# Patient Record
Sex: FEMALE | Race: BLACK OR AFRICAN AMERICAN | NOT HISPANIC OR LATINO | URBAN - METROPOLITAN AREA
[De-identification: names, ages, dates, MRNs, and addresses within clinical notes are randomized per-mention and may not be internally consistent; named-entity substitution may affect disease eponyms.]

---

## 2024-01-16 ENCOUNTER — TELEPHONE (OUTPATIENT)
Dept: PRIMARY CARE | Facility: CLINIC | Age: 44
End: 2024-01-16
Payer: COMMERCIAL

## 2024-01-16 NOTE — TELEPHONE ENCOUNTER
Left message letting her know that Dr. Galvan is not accepting new patients and that we can schedule her with Dr. Bowens if she likes.

## 2024-01-16 NOTE — TELEPHONE ENCOUNTER
----- Message from Loreta Stewart sent at 2024  9:11 AM EST -----  Regarding: WEB/ONLINE APPT REQUEST NO TEMPLATE DUANE  Request submitted on 1/15/2024 at 7:06:05 PM  Form Title: Request an Appointment  Submitted Data  ----------------------------------------    Appointment Information  Epic Provider Name: Nicole Galvan  Specialty: Family Medicine  Have you seen this provider in the last 3 years? No  Reason for Appointment: Overall health assessment/evaluation. I have had a primary care physician since 2019 and I need to make my health a priority again.     Preferred Time of Day: Morning  Afternoon     Preferred Day:      Preferred Location: [facility] Zip: [address]     Patient Information  First Name: Katelynn  Middle:  Last Name: Fahad  YOB: 1980  Gender: Female  Patient Address: [address] [place] [address]  Patient City: [place]  Patient State: [place]  Patient Zip: [address]     Contact Information  First Name: Katelynn  Last Name: Fahad  Phone Number: [phone]  Email: [email or web address]     Insurance Information  Insurance Carrier: [employer]  Insurance Plan: [employer] PPO  Subscriber: Myself  Patient MemberId: [id number]  Patient SSN: [id number]  Patient GroupNumber: [id number]  Subscriber First Name:  Subscriber Last Name:  subscriberID:  Relationship To Subscriber:       Guarantor Information  Guarantor First Name:  Guarantor First Name:  Guarantor :  Guarantor SSN:  Guarantor City:  Guarantor State:  Guarantor Zip:  Guarantor Home Phone:  Guarantor Work Phone:  Guarantor Legal Sex:  Relationship To Patient:  Account Type:

## 2024-01-17 NOTE — TELEPHONE ENCOUNTER
Patient says she has not seen Dr. Galvan before and does not want to schedule with one of the other DrJesse's at this time.

## 2024-03-26 ENCOUNTER — TELEPHONE (OUTPATIENT)
Dept: PRIMARY CARE | Facility: CLINIC | Age: 44
End: 2024-03-26
Payer: COMMERCIAL

## 2024-08-01 ENCOUNTER — HOSPITAL ENCOUNTER (OUTPATIENT)
Dept: RADIOLOGY | Facility: CLINIC | Age: 44
Discharge: HOME | End: 2024-08-01
Payer: COMMERCIAL

## 2024-08-01 VITALS — HEIGHT: 66 IN | BODY MASS INDEX: 36.16 KG/M2 | WEIGHT: 225 LBS

## 2024-08-01 DIAGNOSIS — Z12.31 SCREENING MAMMOGRAM FOR BREAST CANCER: ICD-10-CM

## 2024-08-01 PROCEDURE — 77067 SCR MAMMO BI INCL CAD: CPT

## 2024-08-06 ENCOUNTER — HOSPITAL ENCOUNTER (OUTPATIENT)
Dept: RADIOLOGY | Facility: EXTERNAL LOCATION | Age: 44
Discharge: HOME | End: 2024-08-06

## 2025-02-06 ENCOUNTER — ANCILLARY PROCEDURE (OUTPATIENT)
Dept: URGENT CARE | Age: 45
End: 2025-02-06
Payer: COMMERCIAL

## 2025-02-06 ENCOUNTER — OFFICE VISIT (OUTPATIENT)
Dept: URGENT CARE | Age: 45
End: 2025-02-06
Payer: COMMERCIAL

## 2025-02-06 VITALS
SYSTOLIC BLOOD PRESSURE: 138 MMHG | OXYGEN SATURATION: 98 % | WEIGHT: 245 LBS | DIASTOLIC BLOOD PRESSURE: 82 MMHG | HEART RATE: 100 BPM | BODY MASS INDEX: 39.37 KG/M2 | TEMPERATURE: 98.8 F | RESPIRATION RATE: 20 BRPM | HEIGHT: 66 IN

## 2025-02-06 DIAGNOSIS — S83.92XA SPRAIN OF LEFT KNEE, UNSPECIFIED LIGAMENT, INITIAL ENCOUNTER: Primary | ICD-10-CM

## 2025-02-06 DIAGNOSIS — S83.92XA SPRAIN OF LEFT KNEE, UNSPECIFIED LIGAMENT, INITIAL ENCOUNTER: ICD-10-CM

## 2025-02-06 PROCEDURE — 73564 X-RAY EXAM KNEE 4 OR MORE: CPT | Mod: LEFT SIDE | Performed by: PHYSICIAN ASSISTANT

## 2025-02-06 NOTE — PATIENT INSTRUCTIONS
You were seen for knee pain    X-ray: No obvious displaced fracture.  This is a preliminary reading.  I will notify you of any abnormal findings as interpreted by the radiologist    You most likely have a knee sprain    Plan  1. Take Ibuprofen as directed for pain.   2. Take Tylenol  as needed for pain.  3. Wear ace wrap or splint for comfort and support.  4. RICE therapy: Rest, ice (apply ice to area 4-5 times a day for 20 minutes at a time), compression, elevation.    5. Follow-up with orthopedics as soon as possible for continued pain longer than one week.  6. Please return to ER if you develop new or worsening symptoms.        Knee Pain/Strain:    Many general measures can help your symptoms. General measures include Modifying activities, Taking drugs that relieve pain, Applying heat or cold to the painful area, doing exercises. Bed rest, if required to relieve severe pain, should last no more than 1 or 2 days. Longer bed rest weakens the core muscles and increases stiffness, thus worsening back pain and prolonging recovery. Acetaminophen is usually recommended for pain relief unless inflammation is present. If inflammation is present, over-the-counter or prescription nonsteroidal anti-inflammatory drugs can relieve pain and reduce inflammation. Muscle relaxants, such as carisoprodol, cyclobenzaprine, diazepam, metaxalone, or methocarbamol, are sometimes given to relieve muscle spasms, but their usefulness is controversial. Application of heat or cold may help.  Cold is usually preferred to heat during the first 2 days after an injury. Ice and cold packs should not be applied directly to the skin. After the pain has subsided, light activity, as recommended by a doctor or physical therapist, can speed healing and recovery. In some cases, a course of treatment with a physical therapist can help. Specific exercises to strengthen and stretch the back and to strengthen core muscles are usually recommended to help  prevent low back pain from becoming chronic or recurring. Other preventive measures (maintaining good posture, using a medium mattress with appropriately placed pillows, lifting correctly) should be continued or started. In response to these measures, most episodes of back pain resolve in several days to 2 weeks. Regardless of treatment, 80 to 90% of such episodes resolve within 6 weeks      Knee pain is a common problem among athletes and the general population.    Etiology     There are many causes of pain in or around the knee in athletes, particularly runners, including    ° Subluxation of the patella when bending the knee  ° Chondromalacia of the undersurface of the patella (runner´s knee, which is softening of the knee cap cartilage)  ° Intra-articular pathology, such as meniscal tears and plicae (infolding of the normal synovial lining of the knee)  ° Fat pad inflammation  ° Stress fractures of the tibia  ° Malalignment of the lower extremities  ° Patellar (or infrapatellar) tendinitis (jumper´s knee, which is an overuse injury to the patellar tendon at the attachment to the lower pole of the patella)      Knee pain may be referred from the lumbar spine or hip or result from foot problems (eg, excessive pronation or rolling inward of the foot during walking or running).       Diagnosis   ° History and physical examination  ° Sometimes imaging tests      Diagnosis requires a thorough review of the injured athlete´s training program, including a history of symptom onset and aggravating factors, and a complete lower-extremity examination (for knee examination, see Approach to the Patient With Joint Disease: Physical Examination and see Knee Pain and Meniscal Injuries).     Mechanical symptoms, such as locking or catching, suggest an internal derangement of the knee such as a meniscal tear. Instability symptoms, such as giving way and loss of confidence in the extremity when twisting or turning on the knee,  suggest ligamentous injury or subluxation of the patella.     Chondromalacia is suggested by anterior knee pain after running, especially on hills, as well as pain and stiffness after sitting for any length of time (positive movie sign). On examination, pain is typically reproduced by compression of the patella against the femur.     Pain that becomes worse with weight-bearing suggests a stress fracture.    Treatment   ° Quadriceps-strengthening exercises  ° Sometimes stabilizing pads, supports, or braces  ° Nonsteroidal anti-inflammatory drugs (NSAIDs)      Treatment is tailored to the specific cause of the pain.     Treatment of chondromalacia includes quadriceps-strengthening exercises with balanced strengthening exercises for the hamstrings, use of arch supports if excessive pronation is a possible contributor, and use of NSAIDs.     For patellar subluxation, use of patella-stabilizing pads or braces may be necessary, especially in sports that require rapid, agile movements in various planes (eg, basketball, tennis).     If there is excessive pronation of the foot, and all other possible causes of knee pain have been excluded, use of an orthotic insert is sometimes useful.     Stress fractures require rest and cessation of weight-bearing activity.     Intra-articular pathology often requires surgery.

## 2025-02-06 NOTE — PROGRESS NOTES
"Subjective   Patient ID: Katelynn Vásquez is a 44 y.o. female. They present today with a chief complaint of Knee Injury (Left knee getting out of car twisted to the left and heard a pop ).    CC: Left knee pain    HPI: This is a 44-year-old female presenting for left knee pain/injury that occurred this morning while getting out of the car.  Patient reports that she accidentally twisted her knee.  Surry a pop.  Dull, achy pain localized to the general left knee with ambulating.  Denies bruising or swelling.    The injury was not associated with any skin lacerations or bleeding. No distal neurologic abnormalities such as numbness, tingling, or weakness.   No bowel or bladder dysfunction, saddle anesthesia, paresthesias, sensory deficits or weakness.  No fever.  No abdominal pain.  No chest pain or shortness of breath.      Past Medical History  Allergies as of 02/06/2025    (No Known Allergies)       (Not in a hospital admission)      Past Medical History:   Diagnosis Date    Right lower quadrant pain 01/22/2019    Abdominal wall pain in right lower quadrant       Past Surgical History:   Procedure Laterality Date    TONSILLECTOMY  04/10/2017    Tonsillectomy        reports that she has never smoked. She has never been exposed to tobacco smoke. She has never used smokeless tobacco.    Review of Systems  Review of Systems    After reviewing all body systems I have documented pertinent findings above in the history.  All other Systems reviewed and are negative for complaint.  Pertinent positive and negatives are listed in the above HPI.    Objective    Vitals:    02/06/25 1751   BP: 138/82   Pulse: 100   Resp: 20   Temp: 37.1 °C (98.8 °F)   SpO2: 98%   Weight: 111 kg (245 lb)   Height: 1.676 m (5' 6\")     No LMP recorded (lmp unknown).    Physical Exam  General: No acute distress. Well developed, well nourished.   Skin: Skin is warm, and dry. No rashes or lesions. Nailbeds pink.  Neck: Supple.   Cardiac: Regular " rate  Respiratory:  No acute respiratory distress.  Regular rate of breathing    MSK: Left knee is positive for generalized tenderness.  BL knees show no outward evidence of deformities, swelling, bruising, effusion or erythema. No tenderness with palpation to medial and lateral joint lines. No tenderness with patellar compression.  Good flexion and extension. Negative MCL/LCL laxity. Negative anterior/posterior draw test.     Good plantar and dorsiflexion.   No sensory deficits  DP pulse 2+ bilaterally.    Hip and ankle are normal. No tenderness noted. Good range of motion and strength.    Neurological: A&Ox4. Normal speech, steady gait.   Psych:  Normal affect.  Cooperative.    Procedures  Point of Care Test & Imaging Results from this visit    XR knee left 4+ views    Result Date: 2/6/2025  Interpreted By:  Brad Melo, STUDY: XR KNEE LEFT 4+ VIEWS   INDICATION: Signs/Symptoms:Lysed pain of the left knee.  Injury. twisting.   COMPARISON: None   ACCESSION NUMBER(S): [id number]   ORDERING CLINICIAN: [employer]   FINDINGS: No osseous, articular, or soft tissue abnormality.       Normal radiographs left knee.   Signed by: Brad Melo 2/6/2025 6:21 PM Dictation workstation:   [id number]   Diagnostic study results (if any) were reviewed by Ish Sales PA-C.  Assessment/Plan   Allergies, medications, history, and pertinent labs/EKGs/Imaging reviewed by Ish Sales PA-C.     MDM:   X-ray: No acute findings     No MCL/LCL laxity. Negative anterior/posterior draw test. Joints above and below are normal. Compartments are all soft. No calf tenderness or edema. No sensory deficits and DP pulses is intact. Good plantar and dorsiflexion.  Reflexes are intact.     Clinical findings are less suggestive of fracture, dislocation, ligament tear, infectious or neurovascular causes. Symptoms most likely due to knee sprain. Counseled patient of findings and diagnosis. Advised symptomatic treatment with ace wrap,  Tylenol, and NSIDS. Advised to follow up with orthopedics if pain does not improve over the next 7 to 10 days. Pt/family instructed to return if symptoms worsen or if new symptoms develop. Patient/family expressed understanding and consented to the above plan. No barriers of communication were apparent and I answered all questions.    Orders and Diagnoses  Diagnoses and all orders for this visit:  Sprain of left knee, unspecified ligament, initial encounter  -     XR knee left 4+ views; Future  -     Referral to Orthopaedic Surgery; Future    Medical Admin Record      Patient disposition: Home    Electronically signed by Carlos Enrique Myers PA-C  6:52 PM

## 2025-07-18 ASSESSMENT — DERMATOLOGY QUALITY OF LIFE (QOL) ASSESSMENT
RATE HOW BOTHERED YOU ARE BY EFFECTS OF YOUR SKIN PROBLEMS ON YOUR ACTIVITIES (EG, GOING OUT, ACCOMPLISHING WHAT YOU WANT, WORK ACTIVITIES OR YOUR RELATIONSHIPS WITH OTHERS): 0 - NEVER BOTHERED
RATE HOW EMOTIONALLY BOTHERED YOU ARE BY YOUR SKIN PROBLEM (FOR EXAMPLE, WORRY, EMBARRASSMENT, FRUSTRATION): 6 - ALWAYS BOTHERED
DATE THE QUALITY-OF-LIFE ASSESSMENT WAS COMPLETED: 67404
RATE HOW BOTHERED YOU ARE BY SYMPTOMS OF YOUR SKIN PROBLEM (EG, ITCHING, STINGING BURNING, HURTING OR SKIN IRRITATION): 3
RATE HOW BOTHERED YOU ARE BY EFFECTS OF YOUR SKIN PROBLEMS ON YOUR ACTIVITIES (EG, GOING OUT, ACCOMPLISHING WHAT YOU WANT, WORK ACTIVITIES OR YOUR RELATIONSHIPS WITH OTHERS): 0 - NEVER BOTHERED
RATE HOW BOTHERED YOU ARE BY SYMPTOMS OF YOUR SKIN PROBLEM (EG, ITCHING, STINGING BURNING, HURTING OR SKIN IRRITATION): 3
RATE HOW EMOTIONALLY BOTHERED YOU ARE BY YOUR SKIN PROBLEM (FOR EXAMPLE, WORRY, EMBARRASSMENT, FRUSTRATION): 6 - ALWAYS BOTHERED
WHAT SINGLE SKIN CONDITION LISTED BELOW IS THE PATIENT ANSWERING THE QUALITY-OF-LIFE ASSESSMENT QUESTIONS ABOUT: KELOIDS
WHAT SINGLE SKIN CONDITION LISTED BELOW IS THE PATIENT ANSWERING THE QUALITY-OF-LIFE ASSESSMENT QUESTIONS ABOUT: KELOIDS

## 2025-07-18 ASSESSMENT — PATIENT GLOBAL ASSESSMENT (PGA): WHAT IS THE PGA: PATIENT GLOBAL ASSESSMENT:  2 - MILD

## 2025-07-21 ENCOUNTER — APPOINTMENT (OUTPATIENT)
Dept: DERMATOLOGY | Facility: CLINIC | Age: 45
End: 2025-07-21
Payer: COMMERCIAL

## 2025-07-21 DIAGNOSIS — L91.0 KELOID: Primary | ICD-10-CM

## 2025-07-21 DIAGNOSIS — L91.8 SKIN TAG: ICD-10-CM

## 2025-07-21 PROCEDURE — 99203 OFFICE O/P NEW LOW 30 MIN: CPT | Performed by: STUDENT IN AN ORGANIZED HEALTH CARE EDUCATION/TRAINING PROGRAM

## 2025-07-21 PROCEDURE — 11900 INJECT SKIN LESIONS </W 7: CPT | Performed by: STUDENT IN AN ORGANIZED HEALTH CARE EDUCATION/TRAINING PROGRAM

## 2025-07-21 RX ORDER — TRIAMCINOLONE ACETONIDE 40 MG/ML
40 INJECTION, SUSPENSION INTRA-ARTICULAR; INTRAMUSCULAR ONCE
Status: COMPLETED | OUTPATIENT
Start: 2025-07-21 | End: 2025-07-21

## 2025-07-21 RX ADMIN — TRIAMCINOLONE ACETONIDE 16 MG: 40 INJECTION, SUSPENSION INTRA-ARTICULAR; INTRAMUSCULAR at 14:31

## 2025-07-21 NOTE — PROGRESS NOTES
Henry Vásquez is a 44 y.o. female who presents for the following: Keloid (Right abdomen, gall bladder removed about 4 yrs ago, became raised and itchy. Became infected following surgery, I&D. No prior treatments to the scar. ) and Suspicious Skin Lesion (Lesion to left breast. Present for many years. ).     Intake Questions  Do you have any new or changing Lesions?: (Patient-Rptd) (P) No  Are you an organ transplant recipient?: (Patient-Rptd) (P) No  Have you had or do you have a Staph Infection?: (Patient-Rptd) (P) No  Have you had or do you have Vacular Disease?: (Patient-Rptd) (P) No  Do you use sunscreen?: (Patient-Rptd) (P) Occasionally  Do you use a tanning bed?: (Patient-Rptd) (P) No  Are you trying to get pregnant?: (Patient-Rptd) (P) No  Are you on birth control?: (Patient-Rptd) (P) No  Do you have irregular menstrual cycles?: (Patient-Rptd) (P) No    Review of Systems:  No other skin or systemic complaints other than what is documented elsewhere in the note.    The following portions of the chart were reviewed this encounter and updated as appropriate:          Skin Cancer History  Biopsy Log Book  No skin cancers from Specimen Tracking.    Additional History      Specialty Problems    None       Objective   Well appearing patient in no apparent distress; mood and affect are within normal limits.    A focused skin examination was performed. All findings within normal limits unless otherwise noted below.    Assessment/Plan   Skin Exam  1. KELOID  Right Abdomen (side) - Upper  Shiny, thick, fibrotic pink-brown plaque.  - Intralesional injection - Right Abdomen (side) - Upper  Intralesional Injection:   Date/Time: 7/21/2025 4:12 PM    Consent:     Consent obtained:  Verbal    Consent given by:  Patient    Risks discussed:  Poor cosmetic result, pain, infection and bleeding    Alternatives discussed:  No treatment  Pre-Procedure Details:     Prep Type:  Isopropyl alcohol  Procedure Details:    Injection:  Triamcinolone  Outcome: patient tolerated procedure well  Post-procedure details: sterile dressing applied and wound care instructions given  Dressing type: bandage   Comments:  A total of 0.4 ml of 40 mg/mL Kenalog were injected into 1 lesion(s)    This Visit  - triamcinolone acetonide (Kenalog-40) injection 40 mg  2. SKIN TAG  Left Abdomen (side) - Upper  Pedunculated papules    Discussed benign nature of condition, reassured. Reviewed warning signs of skin cancer with patient.  Snipped as a courtesy to patient today

## 2025-07-21 NOTE — Clinical Note
Pedunculated papules    Discussed benign nature of condition, reassured. Reviewed warning signs of skin cancer with patient.  Snipped as a courtesy to patient today    No

## 2025-08-04 ENCOUNTER — APPOINTMENT (OUTPATIENT)
Dept: RADIOLOGY | Facility: CLINIC | Age: 45
End: 2025-08-04
Payer: COMMERCIAL

## 2025-08-04 DIAGNOSIS — Z12.31 SCREENING MAMMOGRAM FOR BREAST CANCER: ICD-10-CM

## 2025-08-04 PROCEDURE — 77067 SCR MAMMO BI INCL CAD: CPT | Performed by: STUDENT IN AN ORGANIZED HEALTH CARE EDUCATION/TRAINING PROGRAM

## 2025-08-04 PROCEDURE — 77067 SCR MAMMO BI INCL CAD: CPT

## 2025-08-04 PROCEDURE — 77063 BREAST TOMOSYNTHESIS BI: CPT | Performed by: STUDENT IN AN ORGANIZED HEALTH CARE EDUCATION/TRAINING PROGRAM

## 2025-08-23 ASSESSMENT — DERMATOLOGY QUALITY OF LIFE (QOL) ASSESSMENT
RATE HOW EMOTIONALLY BOTHERED YOU ARE BY YOUR SKIN PROBLEM (FOR EXAMPLE, WORRY, EMBARRASSMENT, FRUSTRATION): 1
RATE HOW EMOTIONALLY BOTHERED YOU ARE BY YOUR SKIN PROBLEM (FOR EXAMPLE, WORRY, EMBARRASSMENT, FRUSTRATION): 1
RATE HOW BOTHERED YOU ARE BY SYMPTOMS OF YOUR SKIN PROBLEM (EG, ITCHING, STINGING BURNING, HURTING OR SKIN IRRITATION): 3
WHAT SINGLE SKIN CONDITION LISTED BELOW IS THE PATIENT ANSWERING THE QUALITY-OF-LIFE ASSESSMENT QUESTIONS ABOUT: KELOIDS
RATE HOW BOTHERED YOU ARE BY EFFECTS OF YOUR SKIN PROBLEMS ON YOUR ACTIVITIES (EG, GOING OUT, ACCOMPLISHING WHAT YOU WANT, WORK ACTIVITIES OR YOUR RELATIONSHIPS WITH OTHERS): 0 - NEVER BOTHERED
DATE THE QUALITY-OF-LIFE ASSESSMENT WAS COMPLETED: 67440
RATE HOW BOTHERED YOU ARE BY EFFECTS OF YOUR SKIN PROBLEMS ON YOUR ACTIVITIES (EG, GOING OUT, ACCOMPLISHING WHAT YOU WANT, WORK ACTIVITIES OR YOUR RELATIONSHIPS WITH OTHERS): 0 - NEVER BOTHERED
WHAT SINGLE SKIN CONDITION LISTED BELOW IS THE PATIENT ANSWERING THE QUALITY-OF-LIFE ASSESSMENT QUESTIONS ABOUT: KELOIDS
RATE HOW BOTHERED YOU ARE BY SYMPTOMS OF YOUR SKIN PROBLEM (EG, ITCHING, STINGING BURNING, HURTING OR SKIN IRRITATION): 3

## 2025-08-26 ENCOUNTER — APPOINTMENT (OUTPATIENT)
Dept: DERMATOLOGY | Facility: CLINIC | Age: 45
End: 2025-08-26
Payer: COMMERCIAL

## 2025-08-26 DIAGNOSIS — L91.0 KELOID: ICD-10-CM

## 2025-08-26 DIAGNOSIS — L70.0 ACNE VULGARIS: Primary | ICD-10-CM

## 2025-08-26 PROCEDURE — 99214 OFFICE O/P EST MOD 30 MIN: CPT | Performed by: STUDENT IN AN ORGANIZED HEALTH CARE EDUCATION/TRAINING PROGRAM

## 2025-08-26 PROCEDURE — 11900 INJECT SKIN LESIONS </W 7: CPT | Performed by: STUDENT IN AN ORGANIZED HEALTH CARE EDUCATION/TRAINING PROGRAM

## 2025-08-26 RX ORDER — TRIAMCINOLONE ACETONIDE 40 MG/ML
8 INJECTION, SUSPENSION INTRA-ARTICULAR; INTRAMUSCULAR ONCE
Status: COMPLETED | OUTPATIENT
Start: 2025-08-26 | End: 2025-08-26

## 2025-08-26 RX ORDER — CLINDAMYCIN AND BENZOYL PEROXIDE 10; 50 MG/G; MG/G
GEL TOPICAL DAILY
Qty: 50 G | Refills: 11 | Status: SHIPPED | OUTPATIENT
Start: 2025-08-26

## 2025-08-26 RX ADMIN — TRIAMCINOLONE ACETONIDE 8 MG: 40 INJECTION, SUSPENSION INTRA-ARTICULAR; INTRAMUSCULAR at 09:32

## 2026-02-27 ENCOUNTER — APPOINTMENT (OUTPATIENT)
Dept: DERMATOLOGY | Facility: CLINIC | Age: 46
End: 2026-02-27
Payer: COMMERCIAL